# Patient Record
Sex: FEMALE | Race: BLACK OR AFRICAN AMERICAN | NOT HISPANIC OR LATINO | Employment: OTHER | URBAN - METROPOLITAN AREA
[De-identification: names, ages, dates, MRNs, and addresses within clinical notes are randomized per-mention and may not be internally consistent; named-entity substitution may affect disease eponyms.]

---

## 2017-02-10 ENCOUNTER — IMPORTED ENCOUNTER (OUTPATIENT)
Dept: URBAN - METROPOLITAN AREA CLINIC 9 | Facility: CLINIC | Age: 62
End: 2017-02-10

## 2017-06-08 ENCOUNTER — IMPORTED ENCOUNTER (OUTPATIENT)
Dept: URBAN - METROPOLITAN AREA CLINIC 9 | Facility: CLINIC | Age: 62
End: 2017-06-08

## 2017-08-24 NOTE — PATIENT DISCUSSION
POSTERIOR CAPSULAR FIBROSIS, OU:  VISUALLY SIGNIFICANT. OPTION OF YAG LASER VERSUS UPDATING GLASSES VERSUS FOLLOWING DISCUSSED. RBA'S DISCUSSED, PATIENT UNDERSTANDS AND DESIRES YAG LASER TO INCREASE VISION FOR DRIVING SAFELY AND WATCHING T.V. AND TO REDUCE GLARE. SCHEDULE YAG LASER OD THEN OS.

## 2017-12-20 ENCOUNTER — IMPORTED ENCOUNTER (OUTPATIENT)
Dept: URBAN - METROPOLITAN AREA CLINIC 9 | Facility: CLINIC | Age: 62
End: 2017-12-20

## 2018-03-16 ENCOUNTER — IMPORTED ENCOUNTER (OUTPATIENT)
Dept: URBAN - METROPOLITAN AREA CLINIC 9 | Facility: CLINIC | Age: 63
End: 2018-03-16

## 2018-11-21 ENCOUNTER — IMPORTED ENCOUNTER (OUTPATIENT)
Dept: URBAN - METROPOLITAN AREA CLINIC 9 | Facility: CLINIC | Age: 63
End: 2018-11-21

## 2018-11-28 ENCOUNTER — IMPORTED ENCOUNTER (OUTPATIENT)
Dept: URBAN - METROPOLITAN AREA CLINIC 9 | Facility: CLINIC | Age: 63
End: 2018-11-28

## 2018-12-26 ENCOUNTER — IMPORTED ENCOUNTER (OUTPATIENT)
Dept: URBAN - METROPOLITAN AREA CLINIC 9 | Facility: CLINIC | Age: 63
End: 2018-12-26

## 2019-01-23 ENCOUNTER — IMPORTED ENCOUNTER (OUTPATIENT)
Dept: URBAN - METROPOLITAN AREA CLINIC 9 | Facility: CLINIC | Age: 64
End: 2019-01-23

## 2019-08-28 ENCOUNTER — IMPORTED ENCOUNTER (OUTPATIENT)
Dept: URBAN - METROPOLITAN AREA CLINIC 9 | Facility: CLINIC | Age: 64
End: 2019-08-28

## 2020-02-26 ENCOUNTER — IMPORTED ENCOUNTER (OUTPATIENT)
Dept: URBAN - METROPOLITAN AREA CLINIC 9 | Facility: CLINIC | Age: 65
End: 2020-02-26

## 2020-04-08 ENCOUNTER — IMPORTED ENCOUNTER (OUTPATIENT)
Dept: URBAN - METROPOLITAN AREA CLINIC 9 | Facility: CLINIC | Age: 65
End: 2020-04-08

## 2020-06-03 ENCOUNTER — IMPORTED ENCOUNTER (OUTPATIENT)
Dept: URBAN - METROPOLITAN AREA CLINIC 9 | Facility: CLINIC | Age: 65
End: 2020-06-03

## 2020-07-08 ENCOUNTER — IMPORTED ENCOUNTER (OUTPATIENT)
Dept: URBAN - METROPOLITAN AREA CLINIC 9 | Facility: CLINIC | Age: 65
End: 2020-07-08

## 2020-08-19 ENCOUNTER — IMPORTED ENCOUNTER (OUTPATIENT)
Dept: URBAN - METROPOLITAN AREA CLINIC 9 | Facility: CLINIC | Age: 65
End: 2020-08-19

## 2020-09-30 ENCOUNTER — IMPORTED ENCOUNTER (OUTPATIENT)
Dept: URBAN - METROPOLITAN AREA CLINIC 9 | Facility: CLINIC | Age: 65
End: 2020-09-30

## 2020-10-28 ENCOUNTER — IMPORTED ENCOUNTER (OUTPATIENT)
Dept: URBAN - METROPOLITAN AREA CLINIC 9 | Facility: CLINIC | Age: 65
End: 2020-10-28

## 2020-11-25 ENCOUNTER — IMPORTED ENCOUNTER (OUTPATIENT)
Dept: URBAN - METROPOLITAN AREA CLINIC 9 | Facility: CLINIC | Age: 65
End: 2020-11-25

## 2020-12-30 ENCOUNTER — IMPORTED ENCOUNTER (OUTPATIENT)
Dept: URBAN - METROPOLITAN AREA CLINIC 9 | Facility: CLINIC | Age: 65
End: 2020-12-30

## 2021-01-13 ENCOUNTER — IMPORTED ENCOUNTER (OUTPATIENT)
Dept: URBAN - METROPOLITAN AREA CLINIC 9 | Facility: CLINIC | Age: 66
End: 2021-01-13

## 2021-01-20 ENCOUNTER — IMPORTED ENCOUNTER (OUTPATIENT)
Dept: URBAN - METROPOLITAN AREA CLINIC 9 | Facility: CLINIC | Age: 66
End: 2021-01-20

## 2021-02-11 ENCOUNTER — IMPORTED ENCOUNTER (OUTPATIENT)
Dept: URBAN - METROPOLITAN AREA CLINIC 9 | Facility: CLINIC | Age: 66
End: 2021-02-11

## 2021-02-16 ENCOUNTER — IMPORTED ENCOUNTER (OUTPATIENT)
Dept: URBAN - METROPOLITAN AREA CLINIC 9 | Facility: CLINIC | Age: 66
End: 2021-02-16

## 2021-02-17 ENCOUNTER — IMPORTED ENCOUNTER (OUTPATIENT)
Dept: URBAN - METROPOLITAN AREA CLINIC 9 | Facility: CLINIC | Age: 66
End: 2021-02-17

## 2021-02-25 ENCOUNTER — IMPORTED ENCOUNTER (OUTPATIENT)
Dept: URBAN - METROPOLITAN AREA CLINIC 9 | Facility: CLINIC | Age: 66
End: 2021-02-25

## 2021-03-08 ENCOUNTER — IMPORTED ENCOUNTER (OUTPATIENT)
Dept: URBAN - METROPOLITAN AREA CLINIC 9 | Facility: CLINIC | Age: 66
End: 2021-03-08

## 2021-03-17 ENCOUNTER — IMPORTED ENCOUNTER (OUTPATIENT)
Dept: URBAN - METROPOLITAN AREA CLINIC 9 | Facility: CLINIC | Age: 66
End: 2021-03-17

## 2021-03-22 ENCOUNTER — IMPORTED ENCOUNTER (OUTPATIENT)
Dept: URBAN - METROPOLITAN AREA CLINIC 9 | Facility: CLINIC | Age: 66
End: 2021-03-22

## 2021-05-05 ENCOUNTER — IMPORTED ENCOUNTER (OUTPATIENT)
Dept: URBAN - METROPOLITAN AREA CLINIC 9 | Facility: CLINIC | Age: 66
End: 2021-05-05

## 2021-06-09 ENCOUNTER — IMPORTED ENCOUNTER (OUTPATIENT)
Dept: URBAN - METROPOLITAN AREA CLINIC 9 | Facility: CLINIC | Age: 66
End: 2021-06-09

## 2021-07-21 ENCOUNTER — IMPORTED ENCOUNTER (OUTPATIENT)
Dept: URBAN - METROPOLITAN AREA CLINIC 9 | Facility: CLINIC | Age: 66
End: 2021-07-21

## 2021-09-01 ENCOUNTER — IMPORTED ENCOUNTER (OUTPATIENT)
Dept: URBAN - METROPOLITAN AREA CLINIC 9 | Facility: CLINIC | Age: 66
End: 2021-09-01

## 2021-10-02 ASSESSMENT — VISUAL ACUITY
OS_SC: 20/30 - SN
OD_SC: 20/50 - SN
OD_SC: 20/80 - SN
OD_SC: 20/50 SN
OS_SC: 20/40 SN
OS_SC: 20/40 -2 SN
OD_SC: 20/40 -2 SN
OS_SC: 20/40 +2 SN
OS_SC: 20/40 - SN

## 2021-10-02 ASSESSMENT — TONOMETRY
OS_IOP_MMHG: 40
OS_IOP_MMHG: 55
OS_IOP_MMHG: 28
OS_IOP_MMHG: 13
OD_IOP_MMHG: 25
OD_IOP_MMHG: 22
OS_IOP_MMHG: 24
OD_IOP_MMHG: 15
OD_IOP_MMHG: 26

## 2021-10-16 ASSESSMENT — KERATOMETRY
OS_AXISANGLE2_DEGREES: 178
OD_AXISANGLE_DEGREES: 116
OD_AXISANGLE_DEGREES: 122
OS_AXISANGLE_DEGREES: 88
OS_K2POWER_DIOPTERS: 46.75
OD_AXISANGLE2_DEGREES: 26
OD_K2POWER_DIOPTERS: 47.5
OS_AXISANGLE2_DEGREES: 1
OD_AXISANGLE2_DEGREES: 32
OS_K1POWER_DIOPTERS: 45.5
OS_K1POWER_DIOPTERS: 45.75
OD_K1POWER_DIOPTERS: 46
OD_K2POWER_DIOPTERS: 47.5
OS_K2POWER_DIOPTERS: 47.25
OD_K1POWER_DIOPTERS: 46
OS_AXISANGLE_DEGREES: 91

## 2021-10-16 ASSESSMENT — VISUAL ACUITY
OD_SC: 20/50 + SN
OD_SC: 20/50 -2 SN
OS_SC: 20/50 + SN
OS_SC: 20/30 - SN
OD_SC: 20/60 + SN
OS_CC: 20/25 SN
OS_SC: 20/60 + SN
OD_SC: 20/30 -2 SN
OD_SC: 20/100 SN
OS_SC: 20/30 -2 SN
OD_SC: 20/40 - SN
OD_SC: 20/60 SN
OD_SC: 20/100 - SN
OS_SC: 20/30 + SN
OS_SC: 20/50 - SN
OS_CC: 20/20 SN
OD_SC: 20/50 - SN
OS_SC: 20/40 SN
OS_SC: 20/40 SN
OD_SC: 20/200 + SN
OS_SC: 20/60 - SN
OD_SC: 20/50 -2 SN
OD_CC: 20/25 - SN
OS_SC: 20/40 -2 SN
OS_CC: 20/30 -2 SN
OD_SC: 20/30 -2 SN
OD_SC: 20/40 -2 SN
OD_SC: 20/70 SN
OS_SC: 20/40 - SN
OS_SC: 20/30 - SN
OS_SC: 20/30 -2 SN
OD_SC: 20/50 - SN
OD_SC: 20/80 - SN
OS_SC: 20/30 +2 SN
OD_SC: 20/50 -2 SN
OS_SC: 20/30 + SN
OD_SC: 20/50 + SN
OS_SC: 20/40 - SN
OD_CC: 20/40 SN
OS_SC: 20/30 SN
OS_SC: 20/40 -2 SN
OD_SC: 20/70 - SN
OD_SC: 20/50 SN
OD_SC: 20/40 -2 SN
OS_SC: 20/30 - SN
OS_SC: 20/30 - SN
OD_SC: 20/80 -2 SN
OS_SC: 20/50 -2 SN
OS_PH: 20/30 -2 SN
OD_SC: 20/30 - SN
OD_CC: 20/25 - SN
OD_SC: 20/30 - SN
OD_SC: 20/50 SN
OS_SC: 20/40 SN
OS_SC: 20/40 SN
OS_CC: 20/20 SN
OD_CC: 20/20 SN

## 2021-10-16 ASSESSMENT — TONOMETRY
OS_IOP_MMHG: 19
OD_IOP_MMHG: 26
OS_IOP_MMHG: 25
OS_IOP_MMHG: 11
OD_IOP_MMHG: 28
OD_IOP_MMHG: 29
OS_IOP_MMHG: 24
OD_IOP_MMHG: 15
OD_IOP_MMHG: 21
OS_IOP_MMHG: 19
OD_IOP_MMHG: 15
OD_IOP_MMHG: 21
OS_IOP_MMHG: 18
OS_IOP_MMHG: 23
OD_IOP_MMHG: 20
OD_IOP_MMHG: 25
OS_IOP_MMHG: 16
OS_IOP_MMHG: 11
OS_IOP_MMHG: 16
OD_IOP_MMHG: 24
OS_IOP_MMHG: 15
OS_IOP_MMHG: 22
OD_IOP_MMHG: 23
OD_IOP_MMHG: 15
OD_IOP_MMHG: 16
OD_IOP_MMHG: 23
OD_IOP_MMHG: 15
OS_IOP_MMHG: 23
OD_IOP_MMHG: 24
OS_IOP_MMHG: 14
OS_IOP_MMHG: 24
OD_IOP_MMHG: 21
OD_IOP_MMHG: 22
OD_IOP_MMHG: 17
OS_IOP_MMHG: 45
OS_IOP_MMHG: 22
OS_IOP_MMHG: 17
OD_IOP_MMHG: 26
OD_IOP_MMHG: 14
OD_IOP_MMHG: 10
OS_IOP_MMHG: 24
OS_IOP_MMHG: 17
OD_IOP_MMHG: 17
OD_IOP_MMHG: 11
OD_IOP_MMHG: 10
OD_IOP_MMHG: 18
OS_IOP_MMHG: 17
OS_IOP_MMHG: 22
OS_IOP_MMHG: 21
OS_IOP_MMHG: 20

## 2021-11-01 ENCOUNTER — PREPPED CHART (OUTPATIENT)
Dept: URBAN - METROPOLITAN AREA CLINIC 9 | Facility: CLINIC | Age: 66
End: 2021-11-01

## 2021-11-03 ENCOUNTER — ESTABLISHED PATIENT (OUTPATIENT)
Dept: URBAN - METROPOLITAN AREA CLINIC 9 | Facility: CLINIC | Age: 66
End: 2021-11-03

## 2021-11-03 DIAGNOSIS — H40.2231: ICD-10-CM

## 2021-11-03 DIAGNOSIS — H34.8320: ICD-10-CM

## 2021-11-03 DIAGNOSIS — H35.81: ICD-10-CM

## 2021-11-03 DIAGNOSIS — H34.8310: ICD-10-CM

## 2021-11-03 PROCEDURE — 92014 COMPRE OPH EXAM EST PT 1/>: CPT

## 2021-11-03 PROCEDURE — 92134 CPTRZ OPH DX IMG PST SGM RTA: CPT

## 2021-11-03 PROCEDURE — 67028 INJECTION EYE DRUG: CPT

## 2021-11-03 ASSESSMENT — VISUAL ACUITY
OS_PH: 20/30
OD_SC: 20/40-2
OS_SC: 20/40+2

## 2021-11-03 ASSESSMENT — TONOMETRY
OD_IOP_MMHG: 16
OS_IOP_MMHG: 13

## 2021-12-22 ENCOUNTER — CLINIC PROCEDURE ONLY (OUTPATIENT)
Dept: URBAN - METROPOLITAN AREA CLINIC 9 | Facility: CLINIC | Age: 66
End: 2021-12-22

## 2021-12-22 DIAGNOSIS — H35.81: ICD-10-CM

## 2021-12-22 DIAGNOSIS — H34.8310: ICD-10-CM

## 2021-12-22 DIAGNOSIS — H34.8320: ICD-10-CM

## 2021-12-22 PROCEDURE — 67028 INJECTION EYE DRUG: CPT

## 2021-12-22 PROCEDURE — 92134 CPTRZ OPH DX IMG PST SGM RTA: CPT

## 2021-12-22 ASSESSMENT — VISUAL ACUITY
OD_SC: 20/40-1
OS_SC: 20/30+1

## 2021-12-22 ASSESSMENT — TONOMETRY
OS_IOP_MMHG: 19
OD_IOP_MMHG: 17

## 2022-01-19 ENCOUNTER — CLINIC PROCEDURE ONLY (OUTPATIENT)
Dept: URBAN - METROPOLITAN AREA CLINIC 9 | Facility: CLINIC | Age: 67
End: 2022-01-19

## 2022-01-19 DIAGNOSIS — H35.81: ICD-10-CM

## 2022-01-19 DIAGNOSIS — H34.8310: ICD-10-CM

## 2022-01-19 PROCEDURE — 67028 INJECTION EYE DRUG: CPT

## 2022-01-19 PROCEDURE — 92134 CPTRZ OPH DX IMG PST SGM RTA: CPT

## 2022-01-19 ASSESSMENT — VISUAL ACUITY
OD_SC: 20/40
OS_SC: 20/30

## 2022-01-19 ASSESSMENT — TONOMETRY
OS_IOP_MMHG: 16
OD_IOP_MMHG: 15

## 2022-02-23 ENCOUNTER — ESTABLISHED PATIENT (OUTPATIENT)
Dept: URBAN - METROPOLITAN AREA CLINIC 9 | Facility: CLINIC | Age: 67
End: 2022-02-23

## 2022-02-23 DIAGNOSIS — H35.043: ICD-10-CM

## 2022-02-23 DIAGNOSIS — H34.8320: ICD-10-CM

## 2022-02-23 DIAGNOSIS — H40.2231: ICD-10-CM

## 2022-02-23 DIAGNOSIS — H34.8310: ICD-10-CM

## 2022-02-23 DIAGNOSIS — H35.81: ICD-10-CM

## 2022-02-23 PROCEDURE — 67028 INJECTION EYE DRUG: CPT

## 2022-02-23 PROCEDURE — 92014 COMPRE OPH EXAM EST PT 1/>: CPT

## 2022-02-23 PROCEDURE — 92134 CPTRZ OPH DX IMG PST SGM RTA: CPT

## 2022-02-23 ASSESSMENT — TONOMETRY
OS_IOP_MMHG: 14
OD_IOP_MMHG: 16

## 2022-02-23 ASSESSMENT — VISUAL ACUITY
OS_SC: 20/30+1
OD_SC: 20/30-1

## 2022-04-06 ENCOUNTER — CLINIC PROCEDURE ONLY (OUTPATIENT)
Dept: URBAN - METROPOLITAN AREA CLINIC 9 | Facility: CLINIC | Age: 67
End: 2022-04-06

## 2022-04-06 DIAGNOSIS — H34.8330: ICD-10-CM

## 2022-04-06 DIAGNOSIS — H35.81: ICD-10-CM

## 2022-04-06 PROCEDURE — 92134 CPTRZ OPH DX IMG PST SGM RTA: CPT

## 2022-04-06 PROCEDURE — 67028 INJECTION EYE DRUG: CPT

## 2022-04-06 ASSESSMENT — VISUAL ACUITY
OS_SC: 20/30+1
OD_PH: 20/40+1
OD_SC: 20/40-1

## 2022-04-06 ASSESSMENT — TONOMETRY
OS_IOP_MMHG: 19
OD_IOP_MMHG: 17

## 2022-05-10 ENCOUNTER — ESTABLISHED PATIENT (OUTPATIENT)
Dept: URBAN - METROPOLITAN AREA CLINIC 6 | Facility: CLINIC | Age: 67
End: 2022-05-10

## 2022-05-10 DIAGNOSIS — Z96.1: ICD-10-CM

## 2022-05-10 DIAGNOSIS — H40.2231: ICD-10-CM

## 2022-05-10 DIAGNOSIS — H34.8330: ICD-10-CM

## 2022-05-10 DIAGNOSIS — H35.81: ICD-10-CM

## 2022-05-10 PROCEDURE — 92014 COMPRE OPH EXAM EST PT 1/>: CPT

## 2022-05-10 ASSESSMENT — TONOMETRY
OD_IOP_MMHG: 12
OS_IOP_MMHG: 16

## 2022-05-10 ASSESSMENT — VISUAL ACUITY
OD_SC: 20/40-2
OS_SC: 20/30

## 2022-06-08 ENCOUNTER — CLINIC PROCEDURE ONLY (OUTPATIENT)
Dept: URBAN - METROPOLITAN AREA CLINIC 9 | Facility: CLINIC | Age: 67
End: 2022-06-08

## 2022-06-08 DIAGNOSIS — H35.81: ICD-10-CM

## 2022-06-08 DIAGNOSIS — H34.8330: ICD-10-CM

## 2022-06-08 PROCEDURE — 67028 INJECTION EYE DRUG: CPT

## 2022-06-08 PROCEDURE — 92134 CPTRZ OPH DX IMG PST SGM RTA: CPT

## 2022-06-08 ASSESSMENT — VISUAL ACUITY
OD_PH: 20/40-2
OD_SC: 20/50
OS_SC: 20/40+2

## 2022-06-08 ASSESSMENT — TONOMETRY
OD_IOP_MMHG: 17
OS_IOP_MMHG: 15

## 2022-06-18 RX ORDER — LEVOTHYROXINE SODIUM 0.12 MG/1
TABLET ORAL
COMMUNITY
Start: 2014-09-17

## 2022-06-18 RX ORDER — OXYBUTYNIN CHLORIDE 5 MG/1
TABLET ORAL
COMMUNITY

## 2022-06-18 RX ORDER — FUROSEMIDE 20 MG/1
TABLET ORAL
COMMUNITY

## 2022-06-18 RX ORDER — LATANOPROST 50 UG/ML
SOLUTION/ DROPS OPHTHALMIC
COMMUNITY

## 2022-07-27 ENCOUNTER — ESTABLISHED PATIENT (OUTPATIENT)
Dept: URBAN - METROPOLITAN AREA CLINIC 9 | Facility: CLINIC | Age: 67
End: 2022-07-27

## 2022-07-27 DIAGNOSIS — H35.81: ICD-10-CM

## 2022-07-27 DIAGNOSIS — H40.2231: ICD-10-CM

## 2022-07-27 DIAGNOSIS — H34.8330: ICD-10-CM

## 2022-07-27 PROCEDURE — 92014 COMPRE OPH EXAM EST PT 1/>: CPT

## 2022-07-27 PROCEDURE — 92134 CPTRZ OPH DX IMG PST SGM RTA: CPT

## 2022-07-27 PROCEDURE — 67028 INJECTION EYE DRUG: CPT

## 2022-07-27 ASSESSMENT — VISUAL ACUITY
OS_SC: 20/30
OD_PH: 20/40-2
OD_SC: 20/50

## 2022-07-27 ASSESSMENT — TONOMETRY
OD_IOP_MMHG: 15
OS_IOP_MMHG: 18

## 2022-09-21 ENCOUNTER — CLINIC PROCEDURE ONLY (OUTPATIENT)
Dept: URBAN - METROPOLITAN AREA CLINIC 9 | Facility: CLINIC | Age: 67
End: 2022-09-21

## 2022-09-21 DIAGNOSIS — H35.81: ICD-10-CM

## 2022-09-21 DIAGNOSIS — H34.8330: ICD-10-CM

## 2022-09-21 PROCEDURE — 67028 INJECTION EYE DRUG: CPT

## 2022-09-21 PROCEDURE — 92134 CPTRZ OPH DX IMG PST SGM RTA: CPT

## 2022-09-21 ASSESSMENT — VISUAL ACUITY
OD_SC: 20/40-2
OS_SC: 20/30

## 2022-09-21 ASSESSMENT — TONOMETRY
OS_IOP_MMHG: 19
OD_IOP_MMHG: 14

## 2022-11-16 ENCOUNTER — ESTABLISHED PATIENT (OUTPATIENT)
Dept: URBAN - METROPOLITAN AREA CLINIC 9 | Facility: CLINIC | Age: 67
End: 2022-11-16

## 2022-11-16 DIAGNOSIS — H40.2231: ICD-10-CM

## 2022-11-16 DIAGNOSIS — H34.8330: ICD-10-CM

## 2022-11-16 DIAGNOSIS — H35.043: ICD-10-CM

## 2022-11-16 DIAGNOSIS — H35.81: ICD-10-CM

## 2022-11-16 PROCEDURE — 92134 CPTRZ OPH DX IMG PST SGM RTA: CPT

## 2022-11-16 PROCEDURE — 92014 COMPRE OPH EXAM EST PT 1/>: CPT

## 2022-11-16 PROCEDURE — 67028 INJECTION EYE DRUG: CPT

## 2022-11-16 RX ORDER — DORZOLAMIDE HYDROCHLORIDE TIMOLOL MALEATE 20; 5 MG/ML; MG/ML: 1 SOLUTION/ DROPS OPHTHALMIC TWICE A DAY

## 2022-11-16 RX ORDER — BRIMONIDINE TARTRATE 1.5 MG/ML: 1 SOLUTION/ DROPS OPHTHALMIC TWICE A DAY

## 2022-11-16 ASSESSMENT — VISUAL ACUITY
OD_SC: 20/40-1
OS_SC: 20/25

## 2022-11-16 ASSESSMENT — TONOMETRY
OS_IOP_MMHG: 19
OD_IOP_MMHG: 17

## 2023-01-25 ENCOUNTER — ESTABLISHED PATIENT (OUTPATIENT)
Dept: URBAN - METROPOLITAN AREA CLINIC 9 | Facility: CLINIC | Age: 68
End: 2023-01-25

## 2023-01-25 DIAGNOSIS — H34.8330: ICD-10-CM

## 2023-01-25 DIAGNOSIS — H40.2231: ICD-10-CM

## 2023-01-25 DIAGNOSIS — E11.3293: ICD-10-CM

## 2023-01-25 DIAGNOSIS — H35.81: ICD-10-CM

## 2023-01-25 PROCEDURE — 92134 CPTRZ OPH DX IMG PST SGM RTA: CPT

## 2023-01-25 PROCEDURE — 92014 COMPRE OPH EXAM EST PT 1/>: CPT

## 2023-01-25 PROCEDURE — 67028 INJECTION EYE DRUG: CPT

## 2023-01-25 ASSESSMENT — TONOMETRY
OD_IOP_MMHG: 18
OS_IOP_MMHG: 15

## 2023-01-25 ASSESSMENT — VISUAL ACUITY
OS_SC: 20/20-1
OD_SC: 20/30+1

## 2023-06-14 ENCOUNTER — ESTABLISHED PATIENT (OUTPATIENT)
Dept: URBAN - METROPOLITAN AREA CLINIC 9 | Facility: CLINIC | Age: 68
End: 2023-06-14

## 2023-06-14 DIAGNOSIS — E11.3293: ICD-10-CM

## 2023-06-14 DIAGNOSIS — H34.8330: ICD-10-CM

## 2023-06-14 DIAGNOSIS — H40.2231: ICD-10-CM

## 2023-06-14 DIAGNOSIS — H35.81: ICD-10-CM

## 2023-06-14 PROCEDURE — 92134 CPTRZ OPH DX IMG PST SGM RTA: CPT

## 2023-06-14 PROCEDURE — 67028 INJECTION EYE DRUG: CPT

## 2023-06-14 PROCEDURE — 92014 COMPRE OPH EXAM EST PT 1/>: CPT

## 2023-06-14 ASSESSMENT — VISUAL ACUITY
OD_SC: 20/30
OS_SC: 20/30+1

## 2023-06-14 ASSESSMENT — TONOMETRY
OS_IOP_MMHG: 18
OD_IOP_MMHG: 18

## 2023-08-23 ENCOUNTER — ESTABLISHED PATIENT (OUTPATIENT)
Dept: URBAN - METROPOLITAN AREA CLINIC 9 | Facility: CLINIC | Age: 68
End: 2023-08-23

## 2023-08-23 DIAGNOSIS — H35.81: ICD-10-CM

## 2023-08-23 DIAGNOSIS — H40.2231: ICD-10-CM

## 2023-08-23 DIAGNOSIS — H34.8330: ICD-10-CM

## 2023-08-23 DIAGNOSIS — E11.3293: ICD-10-CM

## 2023-08-23 PROCEDURE — 99214 OFFICE O/P EST MOD 30 MIN: CPT

## 2023-08-23 PROCEDURE — 92134 CPTRZ OPH DX IMG PST SGM RTA: CPT

## 2023-08-23 PROCEDURE — 67028 INJECTION EYE DRUG: CPT

## 2023-08-23 RX ORDER — BRIMONIDINE TARTRATE 1.5 MG/ML: 1 SOLUTION/ DROPS OPHTHALMIC TWICE A DAY

## 2023-08-23 ASSESSMENT — TONOMETRY
OD_IOP_MMHG: 17
OS_IOP_MMHG: 17

## 2023-08-23 ASSESSMENT — VISUAL ACUITY
OU_SC: 20/30
OS_SC: 20/30-1
OD_SC: 20/40+2

## 2023-12-20 ENCOUNTER — ESTABLISHED PATIENT (OUTPATIENT)
Facility: LOCATION | Age: 68
End: 2023-12-20

## 2023-12-20 DIAGNOSIS — E11.3293: ICD-10-CM

## 2023-12-20 DIAGNOSIS — H35.81: ICD-10-CM

## 2023-12-20 DIAGNOSIS — H40.2231: ICD-10-CM

## 2023-12-20 DIAGNOSIS — H34.8330: ICD-10-CM

## 2023-12-20 PROCEDURE — 92134 CPTRZ OPH DX IMG PST SGM RTA: CPT

## 2023-12-20 PROCEDURE — 99214 OFFICE O/P EST MOD 30 MIN: CPT

## 2023-12-20 ASSESSMENT — VISUAL ACUITY
OU_SC: 20/30+2
OD_SC: 20/30
OS_SC: 20/30

## 2023-12-20 ASSESSMENT — TONOMETRY
OS_IOP_MMHG: 24
OD_IOP_MMHG: 24

## 2024-01-15 ENCOUNTER — ESTABLISHED PATIENT (OUTPATIENT)
Dept: URBAN - METROPOLITAN AREA CLINIC 10 | Facility: CLINIC | Age: 69
End: 2024-01-15

## 2024-01-15 DIAGNOSIS — G45.9: ICD-10-CM

## 2024-01-15 DIAGNOSIS — Z96.1: ICD-10-CM

## 2024-01-15 DIAGNOSIS — H04.123: ICD-10-CM

## 2024-01-15 DIAGNOSIS — H35.81: ICD-10-CM

## 2024-01-15 DIAGNOSIS — H40.2231: ICD-10-CM

## 2024-01-15 DIAGNOSIS — E11.3293: ICD-10-CM

## 2024-01-15 DIAGNOSIS — H34.8330: ICD-10-CM

## 2024-01-15 PROCEDURE — 99214 OFFICE O/P EST MOD 30 MIN: CPT

## 2024-01-15 ASSESSMENT — VISUAL ACUITY
OS_SC: 20/20
OD_SC: 20/25-1

## 2024-01-15 ASSESSMENT — TONOMETRY
OD_IOP_MMHG: 12
OS_IOP_MMHG: 15

## 2024-03-20 ENCOUNTER — ESTABLISHED PATIENT (OUTPATIENT)
Facility: LOCATION | Age: 69
End: 2024-03-20

## 2024-03-20 DIAGNOSIS — H40.2231: ICD-10-CM

## 2024-03-20 DIAGNOSIS — H34.8330: ICD-10-CM

## 2024-03-20 DIAGNOSIS — E11.3293: ICD-10-CM

## 2024-03-20 DIAGNOSIS — H35.81: ICD-10-CM

## 2024-03-20 PROCEDURE — 92134 CPTRZ OPH DX IMG PST SGM RTA: CPT

## 2024-03-20 PROCEDURE — 99214 OFFICE O/P EST MOD 30 MIN: CPT

## 2024-03-20 ASSESSMENT — VISUAL ACUITY
OS_SC: 20/25
OD_SC: 20/25-2

## 2024-03-20 ASSESSMENT — TONOMETRY
OS_IOP_MMHG: 17
OD_IOP_MMHG: 17

## 2024-07-24 ENCOUNTER — COMPREHENSIVE EXAM (OUTPATIENT)
Facility: LOCATION | Age: 69
End: 2024-07-24

## 2024-07-24 DIAGNOSIS — H34.8330: ICD-10-CM

## 2024-07-24 DIAGNOSIS — H35.81: ICD-10-CM

## 2024-07-24 DIAGNOSIS — H40.2231: ICD-10-CM

## 2024-07-24 DIAGNOSIS — E11.3293: ICD-10-CM

## 2024-07-24 PROCEDURE — 99214 OFFICE O/P EST MOD 30 MIN: CPT

## 2024-07-24 PROCEDURE — 92134 CPTRZ OPH DX IMG PST SGM RTA: CPT

## 2024-07-24 ASSESSMENT — VISUAL ACUITY
OS_SC: 20/30-2
OD_SC: 20/30-2

## 2024-07-24 ASSESSMENT — TONOMETRY
OS_IOP_MMHG: 18
OD_IOP_MMHG: 15

## 2024-08-21 ENCOUNTER — CLINIC PROCEDURE ONLY (OUTPATIENT)
Facility: LOCATION | Age: 69
End: 2024-08-21

## 2024-08-21 DIAGNOSIS — H35.81: ICD-10-CM

## 2024-08-21 DIAGNOSIS — H34.8330: ICD-10-CM

## 2024-08-21 PROCEDURE — 67028 INJECTION EYE DRUG: CPT

## 2024-08-21 PROCEDURE — 92134 CPTRZ OPH DX IMG PST SGM RTA: CPT

## 2024-08-21 ASSESSMENT — TONOMETRY
OD_IOP_MMHG: 16
OS_IOP_MMHG: 22

## 2024-08-21 ASSESSMENT — VISUAL ACUITY
OD_SC: 20/30+1
OS_SC: 20/40+2

## 2024-09-18 ENCOUNTER — CLINIC PROCEDURE ONLY (OUTPATIENT)
Facility: LOCATION | Age: 69
End: 2024-09-18

## 2024-09-18 DIAGNOSIS — E11.3293: ICD-10-CM

## 2024-09-18 DIAGNOSIS — H35.81: ICD-10-CM

## 2024-09-18 DIAGNOSIS — H34.8330: ICD-10-CM

## 2024-09-18 PROCEDURE — 92134 CPTRZ OPH DX IMG PST SGM RTA: CPT

## 2024-09-18 PROCEDURE — 67028 INJECTION EYE DRUG: CPT

## 2024-10-16 ENCOUNTER — CLINIC PROCEDURE ONLY (OUTPATIENT)
Dept: URBAN - METROPOLITAN AREA CLINIC 11 | Facility: CLINIC | Age: 69
End: 2024-10-16

## 2024-10-16 DIAGNOSIS — H35.81: ICD-10-CM

## 2024-10-16 DIAGNOSIS — H34.8330: ICD-10-CM

## 2024-10-16 DIAGNOSIS — E11.3293: ICD-10-CM

## 2024-10-16 PROCEDURE — 92134 CPTRZ OPH DX IMG PST SGM RTA: CPT

## 2024-10-16 PROCEDURE — 67028 INJECTION EYE DRUG: CPT

## 2024-11-13 ENCOUNTER — COMPREHENSIVE EXAM (OUTPATIENT)
Dept: URBAN - METROPOLITAN AREA CLINIC 11 | Facility: CLINIC | Age: 69
End: 2024-11-13

## 2024-11-13 DIAGNOSIS — H34.8330: ICD-10-CM

## 2024-11-13 DIAGNOSIS — E11.3293: ICD-10-CM

## 2024-11-13 DIAGNOSIS — H40.2231: ICD-10-CM

## 2024-11-13 DIAGNOSIS — H35.81: ICD-10-CM

## 2024-11-13 PROCEDURE — 92134 CPTRZ OPH DX IMG PST SGM RTA: CPT

## 2024-11-13 PROCEDURE — 99214 OFFICE O/P EST MOD 30 MIN: CPT | Mod: 25

## 2024-11-13 PROCEDURE — 67028 INJECTION EYE DRUG: CPT

## 2024-12-11 ENCOUNTER — CLINIC PROCEDURE ONLY (OUTPATIENT)
Age: 69
End: 2024-12-11

## 2024-12-11 DIAGNOSIS — E11.3293: ICD-10-CM

## 2024-12-11 DIAGNOSIS — H35.81: ICD-10-CM

## 2024-12-11 DIAGNOSIS — H34.8330: ICD-10-CM

## 2024-12-11 PROCEDURE — 67028 INJECTION EYE DRUG: CPT

## 2024-12-11 PROCEDURE — 92134 CPTRZ OPH DX IMG PST SGM RTA: CPT

## 2025-01-25 ENCOUNTER — COMPREHENSIVE EXAM (OUTPATIENT)
Age: 70
End: 2025-01-25

## 2025-01-25 DIAGNOSIS — E11.3293: ICD-10-CM

## 2025-01-25 DIAGNOSIS — H35.81: ICD-10-CM

## 2025-01-25 DIAGNOSIS — H34.8330: ICD-10-CM

## 2025-01-25 DIAGNOSIS — H04.123: ICD-10-CM

## 2025-01-25 DIAGNOSIS — H40.2231: ICD-10-CM

## 2025-01-25 PROCEDURE — 92134 CPTRZ OPH DX IMG PST SGM RTA: CPT

## 2025-01-25 PROCEDURE — 67028 INJECTION EYE DRUG: CPT

## 2025-01-25 PROCEDURE — 99214 OFFICE O/P EST MOD 30 MIN: CPT | Mod: 25

## 2025-03-19 ENCOUNTER — CLINIC PROCEDURE ONLY (OUTPATIENT)
Age: 70
End: 2025-03-19

## 2025-03-19 DIAGNOSIS — H35.81: ICD-10-CM

## 2025-03-19 DIAGNOSIS — H34.8330: ICD-10-CM

## 2025-03-19 DIAGNOSIS — E11.3293: ICD-10-CM

## 2025-03-19 PROCEDURE — J9035AVA27 INJECTION, AVASTIN 2.75MG

## 2025-03-19 PROCEDURE — 67028 INJECTION EYE DRUG: CPT

## 2025-03-19 PROCEDURE — 92134 CPTRZ OPH DX IMG PST SGM RTA: CPT

## 2025-05-14 ENCOUNTER — COMPREHENSIVE EXAM (OUTPATIENT)
Age: 70
End: 2025-05-14

## 2025-05-14 DIAGNOSIS — E11.3293: ICD-10-CM

## 2025-05-14 DIAGNOSIS — H34.8330: ICD-10-CM

## 2025-05-14 DIAGNOSIS — H35.81: ICD-10-CM

## 2025-05-14 DIAGNOSIS — H40.2231: ICD-10-CM

## 2025-05-14 DIAGNOSIS — H04.123: ICD-10-CM

## 2025-05-14 PROCEDURE — 92134 CPTRZ OPH DX IMG PST SGM RTA: CPT

## 2025-05-14 PROCEDURE — 99214 OFFICE O/P EST MOD 30 MIN: CPT | Mod: 25

## 2025-05-14 PROCEDURE — 67028 INJECTION EYE DRUG: CPT

## 2025-07-23 ENCOUNTER — COMPREHENSIVE EXAM (OUTPATIENT)
Age: 70
End: 2025-07-23

## 2025-07-23 DIAGNOSIS — H34.8330: ICD-10-CM

## 2025-07-23 DIAGNOSIS — E11.3293: ICD-10-CM

## 2025-07-23 DIAGNOSIS — H35.81: ICD-10-CM

## 2025-07-23 PROCEDURE — J9035JZ AVASTIN, NO DRUG WASTE: Mod: JZ

## 2025-07-23 PROCEDURE — 92134 CPTRZ OPH DX IMG PST SGM RTA: CPT

## 2025-07-23 PROCEDURE — 67028 INJECTION EYE DRUG: CPT
